# Patient Record
Sex: FEMALE | Race: BLACK OR AFRICAN AMERICAN | NOT HISPANIC OR LATINO | Employment: OTHER | URBAN - METROPOLITAN AREA
[De-identification: names, ages, dates, MRNs, and addresses within clinical notes are randomized per-mention and may not be internally consistent; named-entity substitution may affect disease eponyms.]

---

## 2018-06-10 ENCOUNTER — OFFICE VISIT (OUTPATIENT)
Dept: URGENT CARE | Facility: CLINIC | Age: 38
End: 2018-06-10
Payer: MEDICARE

## 2018-06-10 VITALS
TEMPERATURE: 99 F | RESPIRATION RATE: 16 BRPM | DIASTOLIC BLOOD PRESSURE: 116 MMHG | WEIGHT: 142 LBS | HEART RATE: 60 BPM | SYSTOLIC BLOOD PRESSURE: 156 MMHG | OXYGEN SATURATION: 97 % | HEIGHT: 69 IN | BODY MASS INDEX: 21.03 KG/M2

## 2018-06-10 DIAGNOSIS — G44.209 ACUTE NON INTRACTABLE TENSION-TYPE HEADACHE: Primary | ICD-10-CM

## 2018-06-10 DIAGNOSIS — R03.0 ELEVATED BLOOD PRESSURE READING: ICD-10-CM

## 2018-06-10 PROCEDURE — 99203 OFFICE O/P NEW LOW 30 MIN: CPT | Mod: S$GLB,,, | Performed by: PHYSICIAN ASSISTANT

## 2018-06-10 RX ORDER — CLONIDINE 0.1 MG/24H
1 PATCH, EXTENDED RELEASE TRANSDERMAL
COMMUNITY

## 2018-06-10 RX ORDER — SUMATRIPTAN 50 MG/1
TABLET, FILM COATED ORAL
Qty: 20 TABLET | Refills: 0 | Status: SHIPPED | OUTPATIENT
Start: 2018-06-10 | End: 2018-06-10 | Stop reason: SDUPTHER

## 2018-06-10 RX ORDER — SUMATRIPTAN 50 MG/1
TABLET, FILM COATED ORAL
Qty: 20 TABLET | Refills: 0 | Status: SHIPPED | OUTPATIENT
Start: 2018-06-10 | End: 2020-01-27 | Stop reason: CLARIF

## 2018-06-10 RX ORDER — HYDROCHLOROTHIAZIDE 25 MG/1
TABLET ORAL
COMMUNITY

## 2018-06-10 RX ORDER — POTASSIUM CHLORIDE 20 MEQ/1
TABLET, EXTENDED RELEASE ORAL
COMMUNITY

## 2018-06-10 NOTE — PATIENT INSTRUCTIONS
Take medication as prescribed for headaches.  -monitor blood pressure as discussed.    Please follow up with your primary care provider within 2-5 days if your signs and symptoms have not resolved or worsen.     If your condition worsens or fails to improve we recommend that you receive another evaluation at the emergency room immediately or contact your primary medical clinic to discuss your concerns.   You must understand that you have received an Urgent Care treatment only and that you may be released before all of your medical problems are known or treated. You, the patient, will arrange for follow up care as instructed.         Tension Headache    A muscle tension headache is a very common cause of head pain. Its also called a stress headache. When some people are under stress, they tense the muscles of their shoulder, neck, and scalp without knowing it. If this tension lasts long enough, a headache can occur. A tension headache can be quite painful. It can last for hours or even days.  Home care  Follow these tips when caring for yourself at home:  · Dont drive yourself home if you were given pain medicine for your headache. Instead, have someone else drive you home. Try to sleep when you get home. You should feel much better when you wake up.  · Put heat on the back of your neck to help ease neck spasm.  · Drink only clear liquids or eat a light diet until your symptoms get better. This will help you avoid nausea or vomiting.  How to prevent headaches  · Figure out what is causing stress in your life. Learn new ways to handle your stress. Ideas include regular exercise, biofeedback, self-hypnosis, yoga, and meditation. Talk with your healthcare provider to find out more information about managing stress. Many books and digital media are also available on this subject.  · Take time out at the first sign of a tension headache, if possible. Take yourself out of the stressful situation. Find a quiet,  comfortable place to sit or lie down and let yourself relax. Heat and deep massage of the tight areas in the neck and shoulders may help ease muscle spasm. You may also get relief from a medicine like ibuprofen or a prescribed muscle relaxant.  Follow-up care  Follow up with your healthcare provider, or as advised. Talk with your provider if you have frequent headaches. He or she can figure out a treatment plan. Ask if you can have medicine to take at home the next time you get a bad headache. This may keep you from having to visit the emergency department in the future. You may need to see a headache specialist (neurologist) if you continue to have headaches.  When to seek medical advice  Call your healthcare provider right away if any of these occur:  · Your head pain gets worse during sexual intercourse or strenuous activity  · Your head pain doesnt get better within 24 hours  · You arent able to keep liquids down (repeated vomiting)  · Fever of 100.4ºF (38ºC) or higher, or as directed by your healthcare provider  · Stiff neck  · Extreme drowsiness, confusion, or fainting  · Dizziness or dizziness with spinning sensation (vertigo)  · Weakness in an arm or leg or one side of your face  · You have difficulty speaking  · Your vision changes  Date Last Reviewed: 8/1/2016  © 3929-6219 [employer]. [address] [place] [address], [place] [address]. All rights reserved. This information is not intended as a substitute for professional medical care. Always follow your healthcare professional's instructions.

## 2018-06-10 NOTE — PROGRESS NOTES
"Subjective:       Patient ID: Parmjit Harper is a 37 y.o. female.    Vitals:  height is 5' 9" (1.753 m) and weight is 64.4 kg (142 lb). Her tympanic temperature is 98.8 °F (37.1 °C). Her blood pressure is 156/116 (abnormal) and her pulse is 60. Her respiration is 16 and oxygen saturation is 97%.     Chief Complaint: Headache    Patient presents with headache and elevated blood pressure. She states she takes metoprolol and HCTZ daily. She also has clonidine for emergencies, which she also took today around 1:00 PM. She denies chest pain at this time. She has a history of unprovoked cardiac arrest and currently has a pacemaker/defibrillator.       Headache    This is a new problem. The current episode started today. The problem occurs constantly. The problem has been unchanged. The pain is located in the frontal region. The pain radiates to the face. The pain quality is not similar to prior headaches. The quality of the pain is described as aching and squeezing. The pain is at a severity of 8/10. The pain is severe. Associated symptoms include dizziness, nausea and phonophobia. Pertinent negatives include no blurred vision, fever, neck pain, numbness, photophobia, seizures, tinnitus, vomiting or weakness. The symptoms are aggravated by bright light and activity. Her past medical history is significant for migraine headaches. There is no history of migraines in the family.     Review of Systems   Constitution: Positive for chills and malaise/fatigue. Negative for fever and weakness.   HENT: Negative for congestion and tinnitus.    Eyes: Negative for blurred vision and photophobia.   Skin: Negative for rash.   Musculoskeletal: Negative for neck pain.   Gastrointestinal: Positive for nausea. Negative for vomiting.   Neurological: Positive for dizziness, headaches and light-headedness. Negative for disturbances in coordination, numbness and seizures.   Psychiatric/Behavioral: Negative for altered mental status. The " patient is not nervous/anxious.        Objective:      Physical Exam   Constitutional: She is oriented to person, place, and time. She appears well-developed and well-nourished. She does not appear ill. No distress.   Wearing sunglasses in clinic due to photophobia associated with headache.   HENT:   Head: Normocephalic and atraumatic.   Right Ear: External ear normal.   Left Ear: External ear normal.   Nose: Nose normal.   Eyes: Conjunctivae, EOM and lids are normal. Pupils are equal, round, and reactive to light. Right eye exhibits no discharge. Left eye exhibits no discharge.   Neck: Normal range of motion. Neck supple.   Cardiovascular: Normal rate, regular rhythm and normal heart sounds.  Exam reveals no gallop and no friction rub.    No murmur heard.  Pulmonary/Chest: Effort normal and breath sounds normal. No respiratory distress. She has no decreased breath sounds. She has no wheezes. She has no rhonchi. She has no rales.   Musculoskeletal: Normal range of motion.   Neurological: She is alert and oriented to person, place, and time.   Skin: Skin is warm and dry. No rash noted. She is not diaphoretic. No erythema. No pallor.   Psychiatric: She has a normal mood and affect. Her behavior is normal.   Nursing note and vitals reviewed.      Assessment:       1. Acute non intractable tension-type headache    2. Elevated blood pressure reading        Plan:       Unable to determine if headache is due to elevated blood pressure or if elevated blood pressure is due to headache. Treating headache and advised patient to monitor blood pressure. ER precautions given. Patient expressed verbal understanding and agreement with treatment plan.    Acute non intractable tension-type headache  -     sumatriptan (IMITREX) 50 MG tablet; Take 1 tablet as needed for headache. If not improved after 2 hours, take second tablet. Do not exceed 4 tablets in 1 day.  Dispense: 20 tablet; Refill: 0    Elevated blood pressure reading  -      IN OFFICE EKG 12-LEAD (to Muse)      Patient Instructions   Take medication as prescribed for headaches.  -monitor blood pressure as discussed.    Please follow up with your primary care provider within 2-5 days if your signs and symptoms have not resolved or worsen.     If your condition worsens or fails to improve we recommend that you receive another evaluation at the emergency room immediately or contact your primary medical clinic to discuss your concerns.   You must understand that you have received an Urgent Care treatment only and that you may be released before all of your medical problems are known or treated. You, the patient, will arrange for follow up care as instructed.         Tension Headache    A muscle tension headache is a very common cause of head pain. Its also called a stress headache. When some people are under stress, they tense the muscles of their shoulder, neck, and scalp without knowing it. If this tension lasts long enough, a headache can occur. A tension headache can be quite painful. It can last for hours or even days.  Home care  Follow these tips when caring for yourself at home:  · Dont drive yourself home if you were given pain medicine for your headache. Instead, have someone else drive you home. Try to sleep when you get home. You should feel much better when you wake up.  · Put heat on the back of your neck to help ease neck spasm.  · Drink only clear liquids or eat a light diet until your symptoms get better. This will help you avoid nausea or vomiting.  How to prevent headaches  · Figure out what is causing stress in your life. Learn new ways to handle your stress. Ideas include regular exercise, biofeedback, self-hypnosis, yoga, and meditation. Talk with your healthcare provider to find out more information about managing stress. Many books and digital media are also available on this subject.  · Take time out at the first sign of a tension headache, if possible. Take  yourself out of the stressful situation. Find a quiet, comfortable place to sit or lie down and let yourself relax. Heat and deep massage of the tight areas in the neck and shoulders may help ease muscle spasm. You may also get relief from a medicine like ibuprofen or a prescribed muscle relaxant.  Follow-up care  Follow up with your healthcare provider, or as advised. Talk with your provider if you have frequent headaches. He or she can figure out a treatment plan. Ask if you can have medicine to take at home the next time you get a bad headache. This may keep you from having to visit the emergency department in the future. You may need to see a headache specialist (neurologist) if you continue to have headaches.  When to seek medical advice  Call your healthcare provider right away if any of these occur:  · Your head pain gets worse during sexual intercourse or strenuous activity  · Your head pain doesnt get better within 24 hours  · You arent able to keep liquids down (repeated vomiting)  · Fever of 100.4ºF (38ºC) or higher, or as directed by your healthcare provider  · Stiff neck  · Extreme drowsiness, confusion, or fainting  · Dizziness or dizziness with spinning sensation (vertigo)  · Weakness in an arm or leg or one side of your face  · You have difficulty speaking  · Your vision changes  Date Last Reviewed: 8/1/2016  © 6162-9406 [employer]. [address] [place] [address], [place] [address]. All rights reserved. This information is not intended as a substitute for professional medical care. Always follow your healthcare professional's instructions.

## 2019-07-12 ENCOUNTER — OFFICE VISIT (OUTPATIENT)
Dept: URGENT CARE | Facility: CLINIC | Age: 39
End: 2019-07-12
Payer: MEDICAID

## 2019-07-12 VITALS
SYSTOLIC BLOOD PRESSURE: 154 MMHG | TEMPERATURE: 98 F | DIASTOLIC BLOOD PRESSURE: 90 MMHG | OXYGEN SATURATION: 100 % | BODY MASS INDEX: 21.03 KG/M2 | WEIGHT: 142 LBS | RESPIRATION RATE: 18 BRPM | HEART RATE: 86 BPM | HEIGHT: 69 IN

## 2019-07-12 DIAGNOSIS — M54.41 BILATERAL LOW BACK PAIN WITH RIGHT-SIDED SCIATICA, UNSPECIFIED CHRONICITY: Primary | ICD-10-CM

## 2019-07-12 DIAGNOSIS — R03.0 ELEVATED BLOOD PRESSURE READING: ICD-10-CM

## 2019-07-12 PROCEDURE — 99214 OFFICE O/P EST MOD 30 MIN: CPT | Mod: S$GLB,,, | Performed by: PHYSICIAN ASSISTANT

## 2019-07-12 PROCEDURE — 99214 PR OFFICE/OUTPT VISIT, EST, LEVL IV, 30-39 MIN: ICD-10-PCS | Mod: S$GLB,,, | Performed by: PHYSICIAN ASSISTANT

## 2019-07-12 RX ORDER — DEXAMETHASONE SODIUM PHOSPHATE 100 MG/10ML
6 INJECTION INTRAMUSCULAR; INTRAVENOUS
Status: COMPLETED | OUTPATIENT
Start: 2019-07-12 | End: 2019-07-12

## 2019-07-12 RX ORDER — CEPHALEXIN 500 MG/1
CAPSULE ORAL
COMMUNITY
End: 2020-01-27 | Stop reason: CLARIF

## 2019-07-12 RX ORDER — KETOROLAC TROMETHAMINE 30 MG/ML
30 INJECTION, SOLUTION INTRAMUSCULAR; INTRAVENOUS
Status: COMPLETED | OUTPATIENT
Start: 2019-07-12 | End: 2019-07-12

## 2019-07-12 RX ORDER — CYCLOBENZAPRINE HCL 10 MG
10 TABLET ORAL 3 TIMES DAILY PRN
Qty: 30 TABLET | Refills: 0 | Status: SHIPPED | OUTPATIENT
Start: 2019-07-12 | End: 2020-01-27 | Stop reason: CLARIF

## 2019-07-12 RX ORDER — CITALOPRAM 20 MG/1
20 TABLET, FILM COATED ORAL DAILY
COMMUNITY

## 2019-07-12 RX ADMIN — KETOROLAC TROMETHAMINE 30 MG: 30 INJECTION, SOLUTION INTRAMUSCULAR; INTRAVENOUS at 11:07

## 2019-07-12 RX ADMIN — DEXAMETHASONE SODIUM PHOSPHATE 6 MG: 100 INJECTION INTRAMUSCULAR; INTRAVENOUS at 11:07

## 2019-07-12 NOTE — PROGRESS NOTES
"Subjective:       Patient ID: Parmjit Harper is a 38 y.o. female.    Vitals:  height is 5' 9" (1.753 m) and weight is 64.4 kg (142 lb). Her temperature is 98 °F (36.7 °C). Her blood pressure is 154/90 (abnormal) and her pulse is 86. Her respiration is 18 and oxygen saturation is 100%.     Chief Complaint: Back Pain (rt side)    Back Pain   This is a chronic problem. The current episode started yesterday. The problem occurs constantly. The pain is present in the lumbar spine. The quality of the pain is described as aching and shooting. Radiates to: right leg. The pain is at a severity of 8/10. The pain is moderate. The pain is the same all the time. The symptoms are aggravated by bending and standing. Associated symptoms include numbness. Pertinent negatives include no abdominal pain, bladder incontinence, bowel incontinence, chest pain, dysuria, fever, headaches, leg pain, paresis, paresthesias, pelvic pain, perianal numbness, tingling, weakness or weight loss. She has tried NSAIDs and heat for the symptoms. The treatment provided no relief.       Constitution: Negative for chills, sweating, fatigue and fever.   HENT: Negative for ear pain, congestion, sore throat and trouble swallowing.    Neck: Negative for neck pain, neck stiffness, painful lymph nodes and neck swelling.   Cardiovascular: Negative for chest pain, leg swelling, palpitations, sob on exertion and passing out.   Eyes: Negative for eye pain, eye redness, photophobia, double vision, blurred vision and eyelid swelling.   Respiratory: Negative for chest tightness, cough, sputum production, bloody sputum, shortness of breath, stridor and wheezing.    Gastrointestinal: Negative for abdominal pain, abdominal bloating, nausea, vomiting, constipation, diarrhea, heartburn and bowel incontinence.   Genitourinary: Negative for dysuria, frequency, urgency, flank pain, bladder incontinence, hematuria and pelvic pain.   Musculoskeletal: Positive for pain and " back pain. Negative for trauma, joint pain, joint swelling, abnormal ROM of joint, muscle cramps and muscle ache.   Skin: Negative for color change, pale, rash and bruising.   Allergic/Immunologic: Negative for seasonal allergies.   Neurological: Positive for numbness and tingling. Negative for dizziness, light-headedness, passing out, facial drooping, loss of balance, headaches, altered mental status, loss of consciousness, seizures and tremors.   Hematologic/Lymphatic: Negative for swollen lymph nodes.   Psychiatric/Behavioral: Negative for altered mental status, nervous/anxious, sleep disturbance and depression. The patient is not nervous/anxious.        Objective:      Physical Exam   Constitutional: She is oriented to person, place, and time. Vital signs are normal. She appears well-developed and well-nourished. She is active and cooperative.  Non-toxic appearance. She does not appear ill. No distress.   Patient is stable, seated comfortably in NAD.   HENT:   Head: Normocephalic and atraumatic.   Right Ear: External ear normal.   Left Ear: External ear normal.   Nose: Nose normal. No mucosal edema or rhinorrhea. Right sinus exhibits no maxillary sinus tenderness and no frontal sinus tenderness. Left sinus exhibits no maxillary sinus tenderness and no frontal sinus tenderness.   Mouth/Throat: Uvula is midline, oropharynx is clear and moist and mucous membranes are normal. No posterior oropharyngeal erythema.   Eyes: Pupils are equal, round, and reactive to light. Conjunctivae, EOM and lids are normal.   Neck: Trachea normal, normal range of motion, full passive range of motion without pain and phonation normal. Neck supple.   Cardiovascular: Normal rate, regular rhythm, normal heart sounds, intact distal pulses and normal pulses.   Pulmonary/Chest: Effort normal and breath sounds normal.   Abdominal: Soft. Normal appearance and bowel sounds are normal. She exhibits no abdominal bruit, no pulsatile midline mass  and no mass.   Musculoskeletal: She exhibits no edema or deformity.        Lumbar back: She exhibits tenderness, pain and spasm. She exhibits normal range of motion, no bony tenderness, no swelling, no edema, no deformity, no laceration and normal pulse.   FROM to upper and lower extremities bilateral. 5/5 strength and full sensation bilateral. TTP over bilateral lumbar paraspinal muscles with radiating referred RLE pain. No TTP over lumbar spinous processes. 2+ pulses bilateral. No pitting edema to bilateral lower extremities. Patient reports intermittent numbness and tingling down the RLE. Positive straight leg raise. Able to ambulate without difficulty.   Lymphadenopathy:     She has no cervical adenopathy.   Neurological: She is alert and oriented to person, place, and time. She has normal strength and normal reflexes. No cranial nerve deficit or sensory deficit. She displays a negative Romberg sign. Gait normal.   Skin: Skin is warm, dry and intact. Capillary refill takes less than 2 seconds. No rash noted. She is not diaphoretic.   Psychiatric: She has a normal mood and affect. Her speech is normal and behavior is normal. Judgment and thought content normal. Cognition and memory are normal.   Nursing note and vitals reviewed.      Assessment:       1. Bilateral low back pain with right-sided sciatica, unspecified chronicity    2. Elevated blood pressure reading        Plan:         Bilateral low back pain with right-sided sciatica, unspecified chronicity  -     ketorolac injection 30 mg  -     dexamethasone injection 6 mg  -     cyclobenzaprine (FLEXERIL) 10 MG tablet; Take 1 tablet (10 mg total) by mouth 3 (three) times daily as needed for Muscle spasms.  Dispense: 30 tablet; Refill: 0    Elevated blood pressure reading      Patient Instructions   If you were prescribed a narcotic or controlled medication, do not drive or operate heavy equipment or machinery while taking these medications.  You must  understand that you've received an Urgent Care treatment only and that you may be released before all your medical problems are known or treated. You, the patient, will arrange for follow up care as instructed.  Follow up with your PCP or specialty clinic as directed if not improved or as needed. You can call [phone] to schedule an appointment with the appropriate provider.  If your condition worsens we recommend that you receive another evaluation at the Emergency Department for any concerns or worsening of condition.  Patient aware and verbalized understanding.    Patient's BP is elevated today. Patient did not take their medication today. No headache, Chest pain, or SOB. Patient has been advised to take their medicine and monitor the BP for the next few days. If it stays elevated, patient should contact their PCP.  Patient aware, verbalized understanding and agreed with plan of care.    You received a steroid shot today - this can elevate your blood pressure, elevate your blood sugar, water weight gain, nervous energy, redness to the face and dimpling of the skin where the shot goes in.   Patient aware, verbalized understanding and agreed with plan of care.    Recommended no heavy lifting until symptoms resolve.  Toradol 30mg IM given in clinic today - DO NOT START TAKING MOBIC UNTIL TOMORROW. YOU CAN TAKE TYLENOL IF NEEDED IN THE NEXT 4-6 HOURS IF NEEDED.  Flexeril RX (muscle relaxer) - will make you drowsy, so please do not drive or operate heavy equipment or machinery while taking this medication.  You may do gently stretching if tolerable.  Moist warm compresses to area several times daily.   Advised patient to use a Heating pad at home on LOW and Warm baths with OTC Epsom Salt as discussed - MAKE SURE YOU DO NOT FALL ASLEEP WITH a HEATING PAD ON.  Do not stay in one position too long. When sleeping on your back, place a pillow under the knees to reduce tension on back. If sleeping on your side, place  a pillow between the knees to keep spine in better alignment.   Wear supportive shoes such as tennis shoes for support of the neck and lower back during the day.  Follow-up with PCP for further evaluation as needed.  Follow-up with Ortho for further evaluation if still experiencing pain as discussed.  If you lose control of any extremity, your bowel and/or bladder, in between your legs by your genitalia and/or rectum, please go to the nearest Emergency Department immediately.  Strict ER precautions given to patient.  Patient aware and verbalized understanding.    Sciatica    Sciatica is a condition that causes pain in the lower back that spreads down into the buttock, hip, and leg. Sometimes the leg pain can happen without any back pain. Sciatica happens when a spinal nerve is irritated or has pressure put on it as comes out of the spinal canal in the lower back. This most often happens when a bulge or rupture of a nearby spinal disk presses on the nerve. Sciatica can also be caused by a narrowing of the spinal canal (spinal stenosis) or spasm of the muscle in the buttocks that the sciatic nerve passes through (pyriform muscle). Sciatica is also called lumbar radiculopathy.  Sciatica may begin after a sudden twisting or bending force, such as in a car accident. Or it can happen after a simple awkward movement. In either case, muscle spasm often also happens. Muscle spasm makes the pain worse.  A healthcare provider makes a diagnosis of sciatica from your symptoms and a physical exam. Unless you had an injury from a car accident or fall, you usually wont have X-rays taken at this time. This is because the nerves and disks in your back cant be seen on an X-ray. If the provider sees signs of a compressed nerve, you will need to schedule an MRI scan as an outpatient. Signs of a compressed nerve include loss of strength in a leg.  Most sciatica gets better with medicine, exercise, and physical therapy. If your symptoms  continue after at least 3 months of medical treatment, you may need surgery or injections to your lower back.  Home care  Follow these tips when caring for yourself at home:  · You may need to stay in bed the first few days. But as soon as possible, begin sitting up or walking. This will help you avoid problems that come from staying in bed for long periods.  · When in bed, try to find a position that is comfortable. A firm mattress is best. Try lying flat on your back with pillows under your knees. You can also try lying on your side with your knees bent up toward your chest and a pillow between your knees.  · Avoid sitting for long periods. This puts more stress on your lower back than standing or walking.  · Use heat from a hot shower, hot bath, or heating pad to help ease pain. Massage can also help. You can also try using an ice pack. You can make your own ice pack by putting ice cubes in a plastic bag. Wrap the bag in a thin towel. Try both heat and cold to see which works best. Use the method that feels best for 20 minutes several times a day.  · You may use acetaminophen or ibuprofen to ease pain, unless another pain medicine was prescribed. Note: If you have chronic liver or kidney disease, talk with your healthcare provider before taking these medicines. Also talk with your provider if youve had a stomach ulcer or gastrointestinal bleeding.  · Use safe lifting methods. Dont lift anything heavier than 15 pounds until all of the pain is gone.  Follow-up care  Follow up with your healthcare provider, or as advised. You may need physical therapy or additional tests.  If X-rays were taken, a radiologist will look at them. You will be told of any new findings that may affect your care.  When to seek medical advice  Call your healthcare provider right away if any of these occur:  · Pain gets worse even after taking prescribed medicine  · Weakness or numbness in 1 or both legs or hips  · Numbness in your groin  or genital area  · You cant control your bowel or bladder  · Fever  · Redness or swelling over your back or spine   Date Last Reviewed: 8/1/2016  © 7951-7954 [employer]. [address] [place] [address], [place] [address]. All rights reserved. This information is not intended as a substitute for professional medical care. Always follow your healthcare professional's instructions.

## 2019-07-12 NOTE — LETTER
July 12, 2019      [facility]  [place]  [place] [address]  Phone: [phone]  Fax: [phone]       Patient: Parmjit Harper   YOB: 1980  Date of Visit: 07/12/2019    To Whom It May Concern:    Kelvin Harper  was at [facility] on 07/12/2019. She may return to work/school on 07/12/2019 with no restrictions. If you have any questions or concerns, or if I can be of further assistance, please do not hesitate to contact me.    Sincerely,        Luzma Castillo PA-C

## 2019-07-12 NOTE — PATIENT INSTRUCTIONS
If you were prescribed a narcotic or controlled medication, do not drive or operate heavy equipment or machinery while taking these medications.  You must understand that you've received an Urgent Care treatment only and that you may be released before all your medical problems are known or treated. You, the patient, will arrange for follow up care as instructed.  Follow up with your PCP or specialty clinic as directed if not improved or as needed. You can call [phone] to schedule an appointment with the appropriate provider.  If your condition worsens we recommend that you receive another evaluation at the Emergency Department for any concerns or worsening of condition.  Patient aware and verbalized understanding.    Patient's BP is elevated today. Patient did not take their medication today. No headache, Chest pain, or SOB. Patient has been advised to take their medicine and monitor the BP for the next few days. If it stays elevated, patient should contact their PCP.  Patient aware, verbalized understanding and agreed with plan of care.    You received a steroid shot today - this can elevate your blood pressure, elevate your blood sugar, water weight gain, nervous energy, redness to the face and dimpling of the skin where the shot goes in.   Patient aware, verbalized understanding and agreed with plan of care.    Recommended no heavy lifting until symptoms resolve.  Toradol 30mg IM given in clinic today - DO NOT START TAKING MOBIC UNTIL TOMORROW. YOU CAN TAKE TYLENOL IF NEEDED IN THE NEXT 4-6 HOURS IF NEEDED.  Flexeril RX (muscle relaxer) - will make you drowsy, so please do not drive or operate heavy equipment or machinery while taking this medication.  You may do gently stretching if tolerable.  Moist warm compresses to area several times daily.   Advised patient to use a Heating pad at home on LOW and Warm baths with OTC Epsom Salt as discussed - MAKE SURE YOU DO NOT FALL ASLEEP WITH a HEATING PAD  ON.  Do not stay in one position too long. When sleeping on your back, place a pillow under the knees to reduce tension on back. If sleeping on your side, place a pillow between the knees to keep spine in better alignment.   Wear supportive shoes such as tennis shoes for support of the neck and lower back during the day.  Follow-up with PCP for further evaluation as needed.  Follow-up with Ortho for further evaluation if still experiencing pain as discussed.  If you lose control of any extremity, your bowel and/or bladder, in between your legs by your genitalia and/or rectum, please go to the nearest Emergency Department immediately.  Strict ER precautions given to patient.  Patient aware and verbalized understanding.    Sciatica    Sciatica is a condition that causes pain in the lower back that spreads down into the buttock, hip, and leg. Sometimes the leg pain can happen without any back pain. Sciatica happens when a spinal nerve is irritated or has pressure put on it as comes out of the spinal canal in the lower back. This most often happens when a bulge or rupture of a nearby spinal disk presses on the nerve. Sciatica can also be caused by a narrowing of the spinal canal (spinal stenosis) or spasm of the muscle in the buttocks that the sciatic nerve passes through (pyriform muscle). Sciatica is also called lumbar radiculopathy.  Sciatica may begin after a sudden twisting or bending force, such as in a car accident. Or it can happen after a simple awkward movement. In either case, muscle spasm often also happens. Muscle spasm makes the pain worse.  A healthcare provider makes a diagnosis of sciatica from your symptoms and a physical exam. Unless you had an injury from a car accident or fall, you usually wont have X-rays taken at this time. This is because the nerves and disks in your back cant be seen on an X-ray. If the provider sees signs of a compressed nerve, you will need to schedule an MRI scan as an  outpatient. Signs of a compressed nerve include loss of strength in a leg.  Most sciatica gets better with medicine, exercise, and physical therapy. If your symptoms continue after at least 3 months of medical treatment, you may need surgery or injections to your lower back.  Home care  Follow these tips when caring for yourself at home:  · You may need to stay in bed the first few days. But as soon as possible, begin sitting up or walking. This will help you avoid problems that come from staying in bed for long periods.  · When in bed, try to find a position that is comfortable. A firm mattress is best. Try lying flat on your back with pillows under your knees. You can also try lying on your side with your knees bent up toward your chest and a pillow between your knees.  · Avoid sitting for long periods. This puts more stress on your lower back than standing or walking.  · Use heat from a hot shower, hot bath, or heating pad to help ease pain. Massage can also help. You can also try using an ice pack. You can make your own ice pack by putting ice cubes in a plastic bag. Wrap the bag in a thin towel. Try both heat and cold to see which works best. Use the method that feels best for 20 minutes several times a day.  · You may use acetaminophen or ibuprofen to ease pain, unless another pain medicine was prescribed. Note: If you have chronic liver or kidney disease, talk with your healthcare provider before taking these medicines. Also talk with your provider if youve had a stomach ulcer or gastrointestinal bleeding.  · Use safe lifting methods. Dont lift anything heavier than 15 pounds until all of the pain is gone.  Follow-up care  Follow up with your healthcare provider, or as advised. You may need physical therapy or additional tests.  If X-rays were taken, a radiologist will look at them. You will be told of any new findings that may affect your care.  When to seek medical advice  Call your healthcare provider  right away if any of these occur:  · Pain gets worse even after taking prescribed medicine  · Weakness or numbness in 1 or both legs or hips  · Numbness in your groin or genital area  · You cant control your bowel or bladder  · Fever  · Redness or swelling over your back or spine   Date Last Reviewed: 8/1/2016  © 8082-5783 [employer]. [address] [place] [address], [place] [address]. All rights reserved. This information is not intended as a substitute for professional medical care. Always follow your healthcare professional's instructions.

## 2019-07-16 ENCOUNTER — TELEPHONE (OUTPATIENT)
Dept: URGENT CARE | Facility: CLINIC | Age: 39
End: 2019-07-16

## 2020-01-27 ENCOUNTER — HOSPITAL ENCOUNTER (EMERGENCY)
Facility: HOSPITAL | Age: 40
Discharge: HOME | End: 2020-01-27
Attending: EMERGENCY MEDICINE

## 2020-01-27 VITALS
WEIGHT: 150 LBS | SYSTOLIC BLOOD PRESSURE: 187 MMHG | TEMPERATURE: 100 F | OXYGEN SATURATION: 100 % | HEART RATE: 89 BPM | DIASTOLIC BLOOD PRESSURE: 123 MMHG | BODY MASS INDEX: 22.22 KG/M2 | HEIGHT: 69 IN | RESPIRATION RATE: 18 BRPM

## 2020-01-27 DIAGNOSIS — M54.31 SCIATICA OF RIGHT SIDE: Primary | ICD-10-CM

## 2020-01-27 LAB
B-HCG UR QL: NEGATIVE
CTP QC/QA: YES

## 2020-01-27 PROCEDURE — 63600175 PHARM REV CODE 636 W HCPCS: Performed by: EMERGENCY MEDICINE

## 2020-01-27 PROCEDURE — 25000003 PHARM REV CODE 250: Performed by: EMERGENCY MEDICINE

## 2020-01-27 PROCEDURE — 96372 THER/PROPH/DIAG INJ SC/IM: CPT

## 2020-01-27 PROCEDURE — 81025 URINE PREGNANCY TEST: CPT | Performed by: EMERGENCY MEDICINE

## 2020-01-27 PROCEDURE — 99284 EMERGENCY DEPT VISIT MOD MDM: CPT | Mod: 25

## 2020-01-27 RX ORDER — IBUPROFEN 400 MG/1
400 TABLET ORAL EVERY 6 HOURS PRN
Qty: 20 TABLET | Refills: 0 | Status: SHIPPED | OUTPATIENT
Start: 2020-01-27

## 2020-01-27 RX ORDER — METHOCARBAMOL 500 MG/1
1000 TABLET, FILM COATED ORAL 3 TIMES DAILY
Qty: 30 TABLET | Refills: 0 | Status: SHIPPED | OUTPATIENT
Start: 2020-01-27 | End: 2020-02-01

## 2020-01-27 RX ORDER — DIAZEPAM 5 MG/1
5 TABLET ORAL
Status: COMPLETED | OUTPATIENT
Start: 2020-01-27 | End: 2020-01-27

## 2020-01-27 RX ORDER — KETOROLAC TROMETHAMINE 30 MG/ML
15 INJECTION, SOLUTION INTRAMUSCULAR; INTRAVENOUS
Status: COMPLETED | OUTPATIENT
Start: 2020-01-27 | End: 2020-01-27

## 2020-01-27 RX ADMIN — KETOROLAC TROMETHAMINE 15 MG: 30 INJECTION, SOLUTION INTRAMUSCULAR at 01:01

## 2020-01-27 RX ADMIN — DIAZEPAM 5 MG: 5 TABLET ORAL at 01:01

## 2020-01-27 NOTE — ED PROVIDER NOTES
Encounter Date: 1/27/2020    SCRIBE #1 NOTE: I, Debby Booker, am scribing for, and in the presence of,  Dr. Bales. I have scribed the entire note.       History     Chief Complaint   Patient presents with    Sciatica     right side lower back pain that radiates to RLE x 1 week. denies injury. denies urinary symptoms.     Hypertension     incidentlay finding. pt has not taken her homes meds yet today and is asymptomatic.      Parmjit Harper is a 39 y.o. female who  has a past medical history of Anxiety, Cardiac arrest, cause unspecified, Hypertension, Migraines, and Pinched nerve.    The patient presents to the ED due to chronic back pain exacerbation from pinched nerve with onset a week ago. Patient reports she has had chronic pain intermittently since receiving epidural approximately 17 years ago. Pain radiates down right lower extremity only. Patient denies any injury or trauma. Furthermore, patient denies any pain to left lower extremity, urinary/bowel incontinence, saddle numbness/tingling or weakness. Patient reports treatments to relieve pain in the past include Toradol shot and Ibuprofen.     The history is provided by the patient.     Review of patient's allergies indicates:  No Known Allergies  Past Medical History:   Diagnosis Date    Anxiety     Cardiac arrest, cause unspecified     Unprovoked cardiac arrest    Hypertension     Migraines     Pinched nerve      Past Surgical History:   Procedure Laterality Date    ATRIAL CARDIAC PACEMAKER INSERTION       Family History   Problem Relation Age of Onset    Hypertension Mother     Heart disease Mother     Angina Mother     Stroke Son     Hypertension Maternal Grandmother     Heart disease Maternal Grandmother      Social History     Tobacco Use    Smoking status: Current Every Day Smoker     Packs/day: 0.50     Types: Cigarettes    Smokeless tobacco: Never Used   Substance Use Topics    Alcohol use: Yes     Comment: socially    Drug use: No      Review of Systems   Constitutional: Negative for chills, fatigue and fever.   HENT: Negative for facial swelling, trouble swallowing and voice change.    Eyes: Negative for photophobia, pain and redness.   Respiratory: Negative for cough, choking and shortness of breath.    Cardiovascular: Negative for chest pain, palpitations and leg swelling.   Gastrointestinal: Negative for abdominal pain, diarrhea, nausea and vomiting.   Genitourinary: Negative for difficulty urinating, frequency and urgency.   Musculoskeletal: Positive for back pain. Negative for neck pain and neck stiffness.   Neurological: Negative for seizures, speech difficulty, light-headedness, numbness and headaches.   All other systems reviewed and are negative.      Physical Exam     Initial Vitals [01/27/20 1214]   BP Pulse Resp Temp SpO2   (!) 187/123 89 18 99.5 °F (37.5 °C) 100 %      MAP       --         Physical Exam    Nursing note and vitals reviewed.  Constitutional: She appears well-developed and well-nourished. No distress.   HENT:   Head: Normocephalic and atraumatic.   Mouth/Throat: Oropharynx is clear and moist.   Eyes: Conjunctivae and EOM are normal. Pupils are equal, round, and reactive to light.   Neck: Normal range of motion. Neck supple.   Cardiovascular: Normal rate, regular rhythm, normal heart sounds and intact distal pulses.   Pulmonary/Chest: Breath sounds normal. No respiratory distress. She has no wheezes. She has no rhonchi. She has no rales.   Abdominal: Soft. Bowel sounds are normal. She exhibits no distension. There is no tenderness.   Musculoskeletal: Normal range of motion. She exhibits no edema or tenderness.   Positive straight leg test and positive cross straight leg test.     5/5 strength bilaterally in lower extremities  Sensation grossly intact  No midline tenderness to palpation throughout spine no step-offs or deformities no overlying skin changes.   Neurological: She is alert and oriented to person, place,  and time. She has normal strength. No cranial nerve deficit.   Skin: Skin is warm and dry. Capillary refill takes less than 2 seconds.         ED Course   Procedures  Labs Reviewed   POCT URINE PREGNANCY             Medical Decision Making:   Initial Assessment:   Parmjit Harper is a 39 y.o. female with PMHx of Anxiety, HTN, Migraines, and Pinched nerve who presents to ED due to chronic back pain exacerbation from pinched nerve with onset a week ago.  Differential Diagnosis:   Differential Diagnosis includes, but is not limited to:  Sciatica, radiculopathy, muscular strain, muscular sprain, consider although lower likelihood of epidural hematoma, cauda equina, conus medullaris  ED Management:  Plan:  Analgesia, muscle relaxer and reassess.  Likely DC home with feeling improved                   ED Course as of Jan 28 1158   Mon Jan 27, 2020   1412 Patient mentions feeling improved plan for DC home with strict follow-up instructions and return precautions patient has no further questions safe for plan DC home.    [DC]      ED Course User Index  [DC] Amy Bales Jr., MD                Clinical Impression:       ICD-10-CM ICD-9-CM   1. Sciatica of right side M54.31 724.3              I, Amy Bales,  personally performed the services described in this documentation. All medical record entries made by the scribe were at my direction and in my presence.  I have reviewed the chart and agree that the record reflects my personal performance and is accurate and complete. Amy Bales Jr., MD  01/28/20 7015

## 2020-01-27 NOTE — ED TRIAGE NOTES
40 Y/O F with CC of pain to R hip/lower back radiating down extremity x1 week. No other complaints verbalized. NAD noted. Will continue to monitor.

## 2021-04-15 ENCOUNTER — PATIENT MESSAGE (OUTPATIENT)
Dept: RESEARCH | Facility: HOSPITAL | Age: 41
End: 2021-04-15